# Patient Record
Sex: MALE | URBAN - METROPOLITAN AREA
[De-identification: names, ages, dates, MRNs, and addresses within clinical notes are randomized per-mention and may not be internally consistent; named-entity substitution may affect disease eponyms.]

---

## 2018-07-24 ENCOUNTER — APPOINTMENT (RX ONLY)
Dept: URBAN - METROPOLITAN AREA CLINIC 203 | Facility: CLINIC | Age: 28
Setting detail: DERMATOLOGY
End: 2018-07-24

## 2020-02-05 ENCOUNTER — HOSPITAL ENCOUNTER (OUTPATIENT)
Dept: HOSPITAL 47 - RADXRMAIN | Age: 30
Discharge: HOME | End: 2020-02-05
Attending: INTERNAL MEDICINE
Payer: COMMERCIAL

## 2020-02-05 DIAGNOSIS — M41.9: Primary | ICD-10-CM

## 2020-02-05 PROCEDURE — 72082 X-RAY EXAM ENTIRE SPI 2/3 VW: CPT

## 2020-02-05 NOTE — XR
EXAMINATION TYPE: XR scoliosis survey

 

DATE OF EXAM: 2/5/2020

 

COMPARISON: NONE

 

HISTORY: Scoliosis

 

TECHNIQUE: Frontal and lateral views of the thoracolumbar spine were obtained

 

FINDINGS: There is a very mild dextroscoliosis of the lumbar spine. Osorio angle from the superior endp
late of L1 to the inferior endplate of L4 measures only 3 degrees. No paraspinal masses are hemiverte
brae are seen.

 

Visualized bowel is nondilated with mild degree colonic fecal stasis. Lungs are well aerated and thei
r visualized portions.

 

IMPRESSION: Very mild dextroscoliosis of the lumbar spine with Osorio angle only 3 degrees.

## 2020-08-28 ENCOUNTER — HOSPITAL ENCOUNTER (OUTPATIENT)
Dept: HOSPITAL 47 - LABWHC1 | Age: 30
Discharge: HOME | End: 2020-08-28
Attending: INTERNAL MEDICINE
Payer: COMMERCIAL

## 2020-08-28 DIAGNOSIS — Z03.818: Primary | ICD-10-CM

## 2020-10-21 ENCOUNTER — HOSPITAL ENCOUNTER (OUTPATIENT)
Dept: HOSPITAL 47 - LABWHC1 | Age: 30
Discharge: HOME | End: 2020-10-21
Attending: FAMILY MEDICINE
Payer: COMMERCIAL

## 2020-10-21 DIAGNOSIS — R68.83: Primary | ICD-10-CM
